# Patient Record
Sex: MALE | Race: BLACK OR AFRICAN AMERICAN | URBAN - METROPOLITAN AREA
[De-identification: names, ages, dates, MRNs, and addresses within clinical notes are randomized per-mention and may not be internally consistent; named-entity substitution may affect disease eponyms.]

---

## 2020-07-01 ENCOUNTER — LAB OUTSIDE AN ENCOUNTER (OUTPATIENT)
Dept: URBAN - METROPOLITAN AREA CLINIC 92 | Facility: CLINIC | Age: 45
End: 2020-07-01

## 2020-07-01 ENCOUNTER — TELEPHONE ENCOUNTER (OUTPATIENT)
Dept: URBAN - METROPOLITAN AREA CLINIC 92 | Facility: CLINIC | Age: 45
End: 2020-07-01

## 2020-07-15 ENCOUNTER — OFFICE VISIT (OUTPATIENT)
Dept: URBAN - METROPOLITAN AREA MEDICAL CENTER 12 | Facility: MEDICAL CENTER | Age: 45
End: 2020-07-15
Payer: OTHER GOVERNMENT

## 2020-07-15 DIAGNOSIS — R93.3 ABN FINDINGS-GI TRACT: ICD-10-CM

## 2020-07-15 DIAGNOSIS — R10.84 ABDOMINAL CRAMPING, GENERALIZED: ICD-10-CM

## 2020-07-15 DIAGNOSIS — Z53.8 PROCEDURE AND TREATMENT NOT CARRIED OUT FOR OTHER REASONS: ICD-10-CM

## 2020-07-15 DIAGNOSIS — K86.89 ATROPHIC PANCREAS: ICD-10-CM

## 2020-07-15 DIAGNOSIS — K59.09 CHRONIC CONSTIPATION: ICD-10-CM

## 2020-07-15 PROCEDURE — 99243 OFF/OP CNSLTJ NEW/EST LOW 30: CPT | Performed by: INTERNAL MEDICINE

## 2020-07-15 PROCEDURE — 43235 EGD DIAGNOSTIC BRUSH WASH: CPT | Performed by: INTERNAL MEDICINE

## 2020-07-15 PROCEDURE — 99225 SUBSEQUENT OBSERVATION CARE, PER DAY, FOR THE EVALUATION AND MANAGEMENT OF A PATIENT, WHICH REQUIRES AT LEAST 2 OF THESE 3 KEY COMPONENTS: AN EXPAND: CPT | Performed by: INTERNAL MEDICINE

## 2020-07-16 ENCOUNTER — OUT OF OFFICE VISIT (OUTPATIENT)
Dept: URBAN - METROPOLITAN AREA MEDICAL CENTER 12 | Facility: MEDICAL CENTER | Age: 45
End: 2020-07-16
Payer: OTHER GOVERNMENT

## 2020-07-16 DIAGNOSIS — C25.1 CANCER, PANCREAS, BODY: ICD-10-CM

## 2020-07-16 DIAGNOSIS — K76.89 ABNORMAL FINDING ON LIVER FUNCTION: ICD-10-CM

## 2020-07-16 PROCEDURE — 43242 EGD US FINE NEEDLE BX/ASPIR: CPT | Performed by: INTERNAL MEDICINE

## 2021-09-26 ENCOUNTER — OUT OF OFFICE VISIT (OUTPATIENT)
Dept: URBAN - METROPOLITAN AREA MEDICAL CENTER 12 | Facility: MEDICAL CENTER | Age: 46
End: 2021-09-26
Payer: OTHER GOVERNMENT

## 2021-09-26 DIAGNOSIS — D64.89 ANEMIA DUE TO OTHER CAUSE: ICD-10-CM

## 2021-09-26 DIAGNOSIS — C25.9 ADENOCARCINOMA OF PANCREAS: ICD-10-CM

## 2021-09-26 DIAGNOSIS — K92.0 BLOODY EMESIS: ICD-10-CM

## 2021-09-26 DIAGNOSIS — K92.1 ACUTE MELENA: ICD-10-CM

## 2021-09-26 PROCEDURE — 99255 IP/OBS CONSLTJ NEW/EST HI 80: CPT | Performed by: INTERNAL MEDICINE

## 2021-09-27 ENCOUNTER — OUT OF OFFICE VISIT (OUTPATIENT)
Dept: URBAN - METROPOLITAN AREA MEDICAL CENTER 12 | Facility: MEDICAL CENTER | Age: 46
End: 2021-09-27
Payer: OTHER GOVERNMENT

## 2021-09-27 DIAGNOSIS — K92.1 ACUTE MELENA: ICD-10-CM

## 2021-09-27 PROCEDURE — 43235 EGD DIAGNOSTIC BRUSH WASH: CPT | Performed by: INTERNAL MEDICINE

## 2021-10-15 ENCOUNTER — OFFICE VISIT (OUTPATIENT)
Dept: URBAN - METROPOLITAN AREA CLINIC 92 | Facility: CLINIC | Age: 46
End: 2021-10-15

## 2021-10-26 ENCOUNTER — DASHBOARD ENCOUNTERS (OUTPATIENT)
Age: 46
End: 2021-10-26

## 2021-10-26 ENCOUNTER — TELEPHONE ENCOUNTER (OUTPATIENT)
Dept: URBAN - METROPOLITAN AREA CLINIC 92 | Facility: CLINIC | Age: 46
End: 2021-10-26

## 2021-10-26 ENCOUNTER — OFFICE VISIT (OUTPATIENT)
Dept: URBAN - METROPOLITAN AREA CLINIC 92 | Facility: CLINIC | Age: 46
End: 2021-10-26
Payer: OTHER GOVERNMENT

## 2021-10-26 VITALS
DIASTOLIC BLOOD PRESSURE: 73 MMHG | WEIGHT: 126 LBS | HEIGHT: 71 IN | TEMPERATURE: 98 F | HEART RATE: 93 BPM | BODY MASS INDEX: 17.64 KG/M2 | SYSTOLIC BLOOD PRESSURE: 110 MMHG

## 2021-10-26 DIAGNOSIS — K62.5 BRBPR (BRIGHT RED BLOOD PER RECTUM): ICD-10-CM

## 2021-10-26 DIAGNOSIS — C25.9 MALIGNANT NEOPLASM OF PANCREAS, UNSPECIFIED: ICD-10-CM

## 2021-10-26 PROBLEM — 372003004: Status: ACTIVE | Noted: 2021-10-26

## 2021-10-26 PROCEDURE — 99244 OFF/OP CNSLTJ NEW/EST MOD 40: CPT | Performed by: INTERNAL MEDICINE

## 2021-10-26 PROCEDURE — 99204 OFFICE O/P NEW MOD 45 MIN: CPT | Performed by: INTERNAL MEDICINE

## 2021-10-26 NOTE — HPI-TODAY'S VISIT:
45yM with stage IV metastatic pancreatic cancer, who presents as a hospital f/u for GIB.   Presented in Sep 2021 wit hematochezia. EGD unremarkable. Told to consider outpatient colonoscopy. Physical exam was notable for a large hemorrhoid and slight red blood on SUKHDEEP. His hemoglobin remained stable throughout his admission. The patient was started on Anusol.  Hgb 7.7 from 8.1 at time of discharge.  The patient was referred by Dr. Hernandez for GI bleeding.   A copy of this document is being forwarded to the referring provider.